# Patient Record
Sex: MALE | Race: BLACK OR AFRICAN AMERICAN | Employment: FULL TIME | URBAN - METROPOLITAN AREA
[De-identification: names, ages, dates, MRNs, and addresses within clinical notes are randomized per-mention and may not be internally consistent; named-entity substitution may affect disease eponyms.]

---

## 2019-02-14 PROBLEM — R31.29 MICROSCOPIC HEMATURIA: Status: ACTIVE | Noted: 2019-02-14

## 2019-02-14 PROBLEM — R31.9 HEMATURIA: Status: ACTIVE | Noted: 2019-02-14

## 2019-04-08 ENCOUNTER — HOSPITAL ENCOUNTER (OUTPATIENT)
Dept: PREADMISSION TESTING | Age: 39
Discharge: HOME | End: 2019-04-08
Payer: COMMERCIAL

## 2019-04-08 VITALS — WEIGHT: 244 LBS | BODY MASS INDEX: 34.16 KG/M2 | HEIGHT: 71 IN

## 2019-04-08 LAB
ALBUMIN SERPL-MCNC: 4.1 G/DL (ref 3.4–5)
ALBUMIN/GLOB SERPL: 1.1 {RATIO} (ref 0.8–1.7)
ALP SERPL-CCNC: 77 U/L (ref 45–117)
ALT SERPL-CCNC: 34 U/L (ref 16–61)
ANION GAP SERPL CALC-SCNC: 9 MMOL/L (ref 3–18)
AST SERPL-CCNC: 23 U/L (ref 15–37)
BACTERIA SPEC CULT: NORMAL
BILIRUB SERPL-MCNC: 0.7 MG/DL (ref 0.2–1)
BUN SERPL-MCNC: 12 MG/DL (ref 7–18)
BUN/CREAT SERPL: 11 (ref 12–20)
CALCIUM SERPL-MCNC: 9.1 MG/DL (ref 8.5–10.1)
CHLORIDE SERPL-SCNC: 104 MMOL/L (ref 100–108)
CO2 SERPL-SCNC: 27 MMOL/L (ref 21–32)
CREAT SERPL-MCNC: 1.06 MG/DL (ref 0.6–1.3)
ERYTHROCYTE [DISTWIDTH] IN BLOOD BY AUTOMATED COUNT: 12.1 % (ref 11.6–14.5)
GLOBULIN SER CALC-MCNC: 3.8 G/DL (ref 2–4)
GLUCOSE SERPL-MCNC: 78 MG/DL (ref 74–99)
HCT VFR BLD AUTO: 40.5 % (ref 36–48)
HGB BLD-MCNC: 13.8 G/DL (ref 13–16)
MCH RBC QN AUTO: 31.2 PG (ref 24–34)
MCHC RBC AUTO-ENTMCNC: 34.1 G/DL (ref 31–37)
MCV RBC AUTO: 91.6 FL (ref 74–97)
PLATELET # BLD AUTO: 247 K/UL (ref 135–420)
PMV BLD AUTO: 10.3 FL (ref 9.2–11.8)
POTASSIUM SERPL-SCNC: 3.8 MMOL/L (ref 3.5–5.5)
PROT SERPL-MCNC: 7.9 G/DL (ref 6.4–8.2)
RBC # BLD AUTO: 4.42 M/UL (ref 4.7–5.5)
SERVICE CMNT-IMP: NORMAL
SODIUM SERPL-SCNC: 140 MMOL/L (ref 136–145)
WBC # BLD AUTO: 10.8 K/UL (ref 4.6–13.2)

## 2019-04-08 PROCEDURE — 80053 COMPREHEN METABOLIC PANEL: CPT

## 2019-04-08 PROCEDURE — 85027 COMPLETE CBC AUTOMATED: CPT

## 2019-04-08 PROCEDURE — 87641 MR-STAPH DNA AMP PROBE: CPT

## 2019-04-08 PROCEDURE — 93005 ELECTROCARDIOGRAM TRACING: CPT

## 2019-04-08 RX ORDER — CEFAZOLIN SODIUM 2 G/50ML
2 SOLUTION INTRAVENOUS ONCE
Status: CANCELLED | OUTPATIENT
Start: 2019-04-30 | End: 2019-04-30

## 2019-04-08 RX ORDER — SODIUM CHLORIDE, SODIUM LACTATE, POTASSIUM CHLORIDE, CALCIUM CHLORIDE 600; 310; 30; 20 MG/100ML; MG/100ML; MG/100ML; MG/100ML
125 INJECTION, SOLUTION INTRAVENOUS CONTINUOUS
Status: CANCELLED | OUTPATIENT
Start: 2019-04-30

## 2019-04-08 NOTE — PERIOP NOTES
Patient arrived for his PAT visit. Pre-admit testing completed. Patient is very healthy. SARAI prep reviewed and given. Patient denied sleep apnea. Patient does not meet criteria for spec pop. Patient instructed not to have anything to eat after midnight- night before sx. Patient also instructed not to bring valuables and not to wear jewelry DOS. PAT appt completed.

## 2019-04-10 LAB
ATRIAL RATE: 67 BPM
CALCULATED P AXIS, ECG09: 47 DEGREES
CALCULATED R AXIS, ECG10: 34 DEGREES
CALCULATED T AXIS, ECG11: 3 DEGREES
DIAGNOSIS, 93000: NORMAL
P-R INTERVAL, ECG05: 152 MS
Q-T INTERVAL, ECG07: 388 MS
QRS DURATION, ECG06: 92 MS
QTC CALCULATION (BEZET), ECG08: 409 MS
VENTRICULAR RATE, ECG03: 67 BPM

## 2019-04-18 NOTE — H&P
Patient Name:  Aba Castle YOB: 1980 Chief Complaint:  Lower back pain. History of Chief Complaint:  Mr. Willian Vick is a 27-year-old gentleman who is being seen in consultation at the request of Dr. Carey Delacruz for lower back pain. He has had numbness down the leg since October. There is no specific accident or injuries. Standing for any length of time or walking for any length of time causes pain. He has shooting pain down his left hip and leg. He gets burning in the left hip and leg. He also has right-sided numbness in his arm. There is no back pain. His balance seems to be worse, especially in the morning when trying to get up out of bed. He feels like he cannot get his feet under him. He is doing his home exercise program that he got through Dr. Carey Delacruz and is really not making much progress. He has difficulty bending, turning, and twisting. He has been placed on prednisone and muscle relaxers. He is walking and cycling, but nothing where he is moving his feet too fast.He had an MRI of cervical spine because he was found to be myelopathic on physical exam. 
 
Past Medical/Surgical History:  Patient reported no relevant past medical/surgical history. Allergies:  No known allergies. Ingredient Reaction Medication Name Comment NO KNOWN ALLERGIES Current Medications:   
Medication Directions Aleve 220 mg tablet   
multivitamin tablet Omega-3 B Complex   
oregano oil   
turmeric root extract Social History: SMOKING Status Tobacco Type Units Per Day Yrs Used Never smoker ALCOHOL There is a history of alcohol use. Type: Beer and wine. 2 beers consumed rarely. Family History:   
Disease Detail Family Member Age Cause of Death Comments Polymyositis Father  N Lupus erythematosus Sister  N Review of Systems:    Pertinent positives include blood in urine, loss of balance, muscle weakness and numbness/tingling.   Pertinent negatives include blurred vision, chest pain, chills, cold, discharge of the eyes, dizziness, double vision, fever, headache, hearing loss, heart murmur, itching of the eyes, palpitations, redness of the eyes, rheumatic fever, ringing in ears, sore throat/hoarseness, weight change, abdominal pain, anxiety, bipolar disorder, bladder/kidney infection, bloody stool, burning sensation, changes in mood, chronic cough, depression, diarrhea, difficulty breathing, difficulty swallowing, fainting, frequent urinating, fracture/dislocation, gas/bloating, gout, hemorrhoids, incontinence, joint pain, joint stiffness, memory loss, nausea/vomiting, pain on breathing, painful urination, psoriasis, rash/itching, Raynaud's phenomenon, rheumatoid disease, seizure disorder, shortness of breath, sprain/strain, swelling of feet, tendonitis, varicose veins and wheezing. Vitals: 
Date BP Pulse Temp (F) Resp. (per min.) Height (Total in.) Weight (lbs.) BMI  
03/04/2019     70.00 245.00 35.15 Physical Examination:   
General:  The patient appears healthy. Cardiovascular:  Arterial pulses are normal. 
Skin:  The skin is normal appearing with no contusions or wounds noted. Heart- RRR Lungs-CTA winnie Abdomen- +BS,soft,nontender Musculoskeletal:  The thoracolumbar spine has normal kyphosis, a normal appearance, and no scoliosis. There is full range of motion of the cervical, thoracic, and lumbar spine and of the hips, knees, and ankles. Straight leg raising and crossed straight leg raising tests are negative. Neurological:  He has hyperreflexia of the upper and lower extremities. There is no weakness in the thoracic, lumbar, or sacral spine or in the lower extremities or hips. Ankle and knee jerks are normal with no clonus. Radiograph Examination:  AP, lateral, bilateral oblique, flexion and extension views of the lumbar spine were obtained and interpreted in the office 3/4/19 and reveal mild degenerative disc disease at L4-5. An AP view of the pelvis was obtained and interpreted in the office  and reveals no periosteal reaction, no medullary lesions, no osteopenia, well-aligned joint spaces, no chondrolysis, and no fractures. Review of his MRI scan of the cervical spine [facility] 3/13/19reveals severe spinal stenosis, degenerative disc disease, and myelomalacia at C3 through C6. Plan: Mr. Rubina Priest and ALEXANDREA had a long discussion about the treatments for his cervical spinal stenosis, myelomalacia, disc herniation, balance issues, and myelopathy including surgical intervention, the risks, and benefits as well as the different surgical approaches and decision making. We also discussed nonsurgical care for this condition including medications, injections, physical therapy, rehabilitation, activity modification, and brace utilization. At this point, he has improved with physical therapy for the lower back. He can continue with that. Concerning his cervical myelomalacia, he would like to proceed with operative intervention. We will plan for C3 to C6 ACDF. The risks versus the benefits as well as the alternatives were fully explained to the patient. Risks include, but are not limited to, paralysis, death, heart attack, stroke, pulmonary embolism, deep vein thrombosis, infection, failure to relieve pain, increase in back or arm pain, reherniation, scarring, spinal fluid leak, bowel or bladder dysfunction, bleeding, disease transmission, instrumentation failure, pseudarthrosis, difficulty swallowing, and need for revision surgery. The patient states full understanding of the risks and benefits and wishes to proceed.

## 2019-04-24 PROBLEM — G95.89 CERVICAL CORD MYELOMALACIA (HCC): Status: ACTIVE | Noted: 2019-04-24

## 2019-04-24 PROBLEM — M50.30 DDD (DEGENERATIVE DISC DISEASE), CERVICAL: Status: ACTIVE | Noted: 2019-04-24

## 2019-04-24 PROBLEM — M48.02 CERVICAL SPINAL STENOSIS: Status: ACTIVE | Noted: 2019-04-24

## 2019-04-24 PROBLEM — M50.20 HNP (HERNIATED NUCLEUS PULPOSUS), CERVICAL: Status: ACTIVE | Noted: 2019-04-24

## 2019-04-30 ENCOUNTER — ANESTHESIA EVENT (OUTPATIENT)
Dept: SURGERY | Age: 39
DRG: 473 | End: 2019-04-30
Payer: COMMERCIAL

## 2019-04-30 ENCOUNTER — HOSPITAL ENCOUNTER (INPATIENT)
Age: 39
LOS: 1 days | Discharge: HOME HEALTH | DRG: 473 | End: 2019-05-01
Attending: ORTHOPAEDIC SURGERY | Admitting: ORTHOPAEDIC SURGERY
Payer: COMMERCIAL

## 2019-04-30 ENCOUNTER — APPOINTMENT (OUTPATIENT)
Dept: GENERAL RADIOLOGY | Age: 39
DRG: 473 | End: 2019-04-30
Attending: ORTHOPAEDIC SURGERY
Payer: COMMERCIAL

## 2019-04-30 ENCOUNTER — ANESTHESIA (OUTPATIENT)
Dept: SURGERY | Age: 39
DRG: 473 | End: 2019-04-30
Payer: COMMERCIAL

## 2019-04-30 DIAGNOSIS — M48.02 CERVICAL SPINAL STENOSIS: Primary | ICD-10-CM

## 2019-04-30 LAB
ABO + RH BLD: NORMAL
BLOOD GROUP ANTIBODIES SERPL: NORMAL
SPECIMEN EXP DATE BLD: NORMAL

## 2019-04-30 PROCEDURE — 74011000250 HC RX REV CODE- 250: Performed by: ORTHOPAEDIC SURGERY

## 2019-04-30 PROCEDURE — 74011000272 HC RX REV CODE- 272: Performed by: ORTHOPAEDIC SURGERY

## 2019-04-30 PROCEDURE — 74011000250 HC RX REV CODE- 250

## 2019-04-30 PROCEDURE — 97162 PT EVAL MOD COMPLEX 30 MIN: CPT

## 2019-04-30 PROCEDURE — 77030013567 HC DRN WND RESERV BARD -A: Performed by: ORTHOPAEDIC SURGERY

## 2019-04-30 PROCEDURE — 77030011267 HC ELECTRD BLD COVD -A: Performed by: ORTHOPAEDIC SURGERY

## 2019-04-30 PROCEDURE — C1713 ANCHOR/SCREW BN/BN,TIS/BN: HCPCS | Performed by: ORTHOPAEDIC SURGERY

## 2019-04-30 PROCEDURE — 76060000034 HC ANESTHESIA 1.5 TO 2 HR: Performed by: ORTHOPAEDIC SURGERY

## 2019-04-30 PROCEDURE — 0RG20K0 FUSION OF 2 OR MORE CERVICAL VERTEBRAL JOINTS WITH NONAUTOLOGOUS TISSUE SUBSTITUTE, ANTERIOR APPROACH, ANTERIOR COLUMN, OPEN APPROACH: ICD-10-PCS | Performed by: ORTHOPAEDIC SURGERY

## 2019-04-30 PROCEDURE — 77030004402 HC BUR NEUR STRY -C: Performed by: ORTHOPAEDIC SURGERY

## 2019-04-30 PROCEDURE — 74011250636 HC RX REV CODE- 250/636

## 2019-04-30 PROCEDURE — 77030039266 HC ADH SKN EXOFIN S2SG -A: Performed by: ORTHOPAEDIC SURGERY

## 2019-04-30 PROCEDURE — 77010033678 HC OXYGEN DAILY

## 2019-04-30 PROCEDURE — 74011250637 HC RX REV CODE- 250/637: Performed by: PHYSICIAN ASSISTANT

## 2019-04-30 PROCEDURE — 97116 GAIT TRAINING THERAPY: CPT

## 2019-04-30 PROCEDURE — 77030036881: Performed by: ORTHOPAEDIC SURGERY

## 2019-04-30 PROCEDURE — 77030009863 HC PIN CERV DISTR SYNT -B: Performed by: ORTHOPAEDIC SURGERY

## 2019-04-30 PROCEDURE — 77030031588 HC SPCR SPN ZEROP VA SYNT -I: Performed by: ORTHOPAEDIC SURGERY

## 2019-04-30 PROCEDURE — 0RT30ZZ RESECTION OF CERVICAL VERTEBRAL DISC, OPEN APPROACH: ICD-10-PCS | Performed by: ORTHOPAEDIC SURGERY

## 2019-04-30 PROCEDURE — 77030008462 HC STPLR SKN PROX J&J -A: Performed by: ORTHOPAEDIC SURGERY

## 2019-04-30 PROCEDURE — 74011250636 HC RX REV CODE- 250/636: Performed by: ORTHOPAEDIC SURGERY

## 2019-04-30 PROCEDURE — 77030008683 HC TU ET CUF COVD -A: Performed by: ANESTHESIOLOGY

## 2019-04-30 PROCEDURE — 65270000029 HC RM PRIVATE

## 2019-04-30 PROCEDURE — 76010000153 HC OR TIME 1.5 TO 2 HR: Performed by: ORTHOPAEDIC SURGERY

## 2019-04-30 PROCEDURE — 77030040361 HC SLV COMPR DVT MDII -B: Performed by: ORTHOPAEDIC SURGERY

## 2019-04-30 PROCEDURE — 77030003029 HC SUT VCRL J&J -B: Performed by: ORTHOPAEDIC SURGERY

## 2019-04-30 PROCEDURE — 77030018836 HC SOL IRR NACL ICUM -A: Performed by: ORTHOPAEDIC SURGERY

## 2019-04-30 PROCEDURE — 0RG20A0 FUSION OF 2 OR MORE CERVICAL VERTEBRAL JOINTS WITH INTERBODY FUSION DEVICE, ANTERIOR APPROACH, ANTERIOR COLUMN, OPEN APPROACH: ICD-10-PCS | Performed by: ORTHOPAEDIC SURGERY

## 2019-04-30 PROCEDURE — 74011250636 HC RX REV CODE- 250/636: Performed by: PHYSICIAN ASSISTANT

## 2019-04-30 PROCEDURE — 36415 COLL VENOUS BLD VENIPUNCTURE: CPT

## 2019-04-30 PROCEDURE — 77030002986 HC SUT PROL J&J -A: Performed by: ORTHOPAEDIC SURGERY

## 2019-04-30 PROCEDURE — 86900 BLOOD TYPING SEROLOGIC ABO: CPT

## 2019-04-30 PROCEDURE — 74011250637 HC RX REV CODE- 250/637: Performed by: ANESTHESIOLOGY

## 2019-04-30 PROCEDURE — 76210000016 HC OR PH I REC 1 TO 1.5 HR: Performed by: ORTHOPAEDIC SURGERY

## 2019-04-30 PROCEDURE — 77030034475 HC MISC IMPL SPN: Performed by: ORTHOPAEDIC SURGERY

## 2019-04-30 PROCEDURE — 77030012406 HC DRN WND PENRS BARD -A: Performed by: ORTHOPAEDIC SURGERY

## 2019-04-30 PROCEDURE — 77030020782 HC GWN BAIR PAWS FLX 3M -B: Performed by: ORTHOPAEDIC SURGERY

## 2019-04-30 DEVICE — SCREW SPNL 4.2X14MM SELF DRL INVUE: Type: IMPLANTABLE DEVICE | Site: SPINE CERVICAL | Status: FUNCTIONAL

## 2019-04-30 DEVICE — SCREW SPNL L16MM DIA3.7MM NONSTERILE ANT CERV PUR TI SELF: Type: IMPLANTABLE DEVICE | Site: SPINE CERVICAL | Status: FUNCTIONAL

## 2019-04-30 DEVICE — PLATE SPNL L43MM 2 LEV ACP INVUE: Type: IMPLANTABLE DEVICE | Site: SPINE CERVICAL | Status: FUNCTIONAL

## 2019-04-30 DEVICE — GRAFT SPNL SPCR W145XH9XL12MM 7DEG CERV LORDOSIS PRESERVON: Type: IMPLANTABLE DEVICE | Site: SPINE CERVICAL | Status: FUNCTIONAL

## 2019-04-30 DEVICE — SPACER SPNL ZERO-P VA IMPL 9MM LORDOTIC STERILE: Type: IMPLANTABLE DEVICE | Site: SPINE CERVICAL | Status: FUNCTIONAL

## 2019-04-30 RX ORDER — ZOLPIDEM TARTRATE 5 MG/1
5 TABLET ORAL
Status: DISCONTINUED | OUTPATIENT
Start: 2019-04-30 | End: 2019-05-01 | Stop reason: HOSPADM

## 2019-04-30 RX ORDER — DEXTROSE 50 % IN WATER (D50W) INTRAVENOUS SYRINGE
25-50 AS NEEDED
Status: DISCONTINUED | OUTPATIENT
Start: 2019-04-30 | End: 2019-04-30 | Stop reason: HOSPADM

## 2019-04-30 RX ORDER — DIPHENHYDRAMINE HCL 25 MG
25 CAPSULE ORAL
Status: DISCONTINUED | OUTPATIENT
Start: 2019-04-30 | End: 2019-05-01 | Stop reason: HOSPADM

## 2019-04-30 RX ORDER — INSULIN LISPRO 100 [IU]/ML
INJECTION, SOLUTION INTRAVENOUS; SUBCUTANEOUS ONCE
Status: DISCONTINUED | OUTPATIENT
Start: 2019-04-30 | End: 2019-04-30 | Stop reason: HOSPADM

## 2019-04-30 RX ORDER — SODIUM CHLORIDE, SODIUM LACTATE, POTASSIUM CHLORIDE, CALCIUM CHLORIDE 600; 310; 30; 20 MG/100ML; MG/100ML; MG/100ML; MG/100ML
50 INJECTION, SOLUTION INTRAVENOUS CONTINUOUS
Status: DISCONTINUED | OUTPATIENT
Start: 2019-04-30 | End: 2019-04-30 | Stop reason: HOSPADM

## 2019-04-30 RX ORDER — CEFAZOLIN SODIUM 2 G/50ML
2 SOLUTION INTRAVENOUS ONCE
Status: COMPLETED | OUTPATIENT
Start: 2019-04-30 | End: 2019-04-30

## 2019-04-30 RX ORDER — FENTANYL CITRATE 50 UG/ML
25 INJECTION, SOLUTION INTRAMUSCULAR; INTRAVENOUS
Status: DISCONTINUED | OUTPATIENT
Start: 2019-04-30 | End: 2019-04-30 | Stop reason: HOSPADM

## 2019-04-30 RX ORDER — PROPOFOL 10 MG/ML
INJECTION, EMULSION INTRAVENOUS AS NEEDED
Status: DISCONTINUED | OUTPATIENT
Start: 2019-04-30 | End: 2019-04-30 | Stop reason: HOSPADM

## 2019-04-30 RX ORDER — OXYCODONE AND ACETAMINOPHEN 5; 325 MG/1; MG/1
1 TABLET ORAL AS NEEDED
Status: DISCONTINUED | OUTPATIENT
Start: 2019-04-30 | End: 2019-04-30 | Stop reason: HOSPADM

## 2019-04-30 RX ORDER — ONDANSETRON 2 MG/ML
4 INJECTION INTRAMUSCULAR; INTRAVENOUS ONCE
Status: DISCONTINUED | OUTPATIENT
Start: 2019-04-30 | End: 2019-04-30 | Stop reason: HOSPADM

## 2019-04-30 RX ORDER — CEFAZOLIN SODIUM 2 G/50ML
2 SOLUTION INTRAVENOUS EVERY 8 HOURS
Status: COMPLETED | OUTPATIENT
Start: 2019-04-30 | End: 2019-05-01

## 2019-04-30 RX ORDER — NALOXONE HYDROCHLORIDE 0.4 MG/ML
0.1 INJECTION, SOLUTION INTRAMUSCULAR; INTRAVENOUS; SUBCUTANEOUS AS NEEDED
Status: DISCONTINUED | OUTPATIENT
Start: 2019-04-30 | End: 2019-05-01 | Stop reason: HOSPADM

## 2019-04-30 RX ORDER — DOCUSATE SODIUM 100 MG/1
100 CAPSULE, LIQUID FILLED ORAL 2 TIMES DAILY
Status: DISCONTINUED | OUTPATIENT
Start: 2019-04-30 | End: 2019-05-01 | Stop reason: HOSPADM

## 2019-04-30 RX ORDER — OXYCODONE AND ACETAMINOPHEN 10; 325 MG/1; MG/1
1 TABLET ORAL
Status: DISCONTINUED | OUTPATIENT
Start: 2019-04-30 | End: 2019-05-01 | Stop reason: HOSPADM

## 2019-04-30 RX ORDER — ROCURONIUM BROMIDE 10 MG/ML
INJECTION, SOLUTION INTRAVENOUS AS NEEDED
Status: DISCONTINUED | OUTPATIENT
Start: 2019-04-30 | End: 2019-04-30 | Stop reason: HOSPADM

## 2019-04-30 RX ORDER — MIDAZOLAM HYDROCHLORIDE 1 MG/ML
INJECTION, SOLUTION INTRAMUSCULAR; INTRAVENOUS AS NEEDED
Status: DISCONTINUED | OUTPATIENT
Start: 2019-04-30 | End: 2019-04-30 | Stop reason: HOSPADM

## 2019-04-30 RX ORDER — LIDOCAINE HYDROCHLORIDE 20 MG/ML
INJECTION, SOLUTION EPIDURAL; INFILTRATION; INTRACAUDAL; PERINEURAL AS NEEDED
Status: DISCONTINUED | OUTPATIENT
Start: 2019-04-30 | End: 2019-04-30 | Stop reason: HOSPADM

## 2019-04-30 RX ORDER — DEXAMETHASONE SODIUM PHOSPHATE 4 MG/ML
INJECTION, SOLUTION INTRA-ARTICULAR; INTRALESIONAL; INTRAMUSCULAR; INTRAVENOUS; SOFT TISSUE AS NEEDED
Status: DISCONTINUED | OUTPATIENT
Start: 2019-04-30 | End: 2019-04-30 | Stop reason: HOSPADM

## 2019-04-30 RX ORDER — NEOSTIGMINE METHYLSULFATE 5 MG/5 ML
SYRINGE (ML) INTRAVENOUS AS NEEDED
Status: DISCONTINUED | OUTPATIENT
Start: 2019-04-30 | End: 2019-04-30 | Stop reason: HOSPADM

## 2019-04-30 RX ORDER — GLYCOPYRROLATE 0.2 MG/ML
INJECTION INTRAMUSCULAR; INTRAVENOUS AS NEEDED
Status: DISCONTINUED | OUTPATIENT
Start: 2019-04-30 | End: 2019-04-30 | Stop reason: HOSPADM

## 2019-04-30 RX ORDER — HYDROMORPHONE HYDROCHLORIDE 2 MG/ML
0.5 INJECTION, SOLUTION INTRAMUSCULAR; INTRAVENOUS; SUBCUTANEOUS
Status: DISCONTINUED | OUTPATIENT
Start: 2019-04-30 | End: 2019-04-30 | Stop reason: HOSPADM

## 2019-04-30 RX ORDER — OXYCODONE AND ACETAMINOPHEN 5; 325 MG/1; MG/1
1 TABLET ORAL
Status: DISCONTINUED | OUTPATIENT
Start: 2019-04-30 | End: 2019-05-01 | Stop reason: HOSPADM

## 2019-04-30 RX ORDER — DIAZEPAM 5 MG/1
5 TABLET ORAL ONCE
Status: COMPLETED | OUTPATIENT
Start: 2019-04-30 | End: 2019-04-30

## 2019-04-30 RX ORDER — ACETAMINOPHEN 325 MG/1
650 TABLET ORAL
Status: DISCONTINUED | OUTPATIENT
Start: 2019-04-30 | End: 2019-05-01 | Stop reason: HOSPADM

## 2019-04-30 RX ORDER — ONDANSETRON 2 MG/ML
INJECTION INTRAMUSCULAR; INTRAVENOUS AS NEEDED
Status: DISCONTINUED | OUTPATIENT
Start: 2019-04-30 | End: 2019-04-30 | Stop reason: HOSPADM

## 2019-04-30 RX ORDER — EPHEDRINE SULFATE/0.9% NACL/PF 25 MG/5 ML
SYRINGE (ML) INTRAVENOUS AS NEEDED
Status: DISCONTINUED | OUTPATIENT
Start: 2019-04-30 | End: 2019-04-30 | Stop reason: HOSPADM

## 2019-04-30 RX ORDER — MAGNESIUM SULFATE 100 %
4 CRYSTALS MISCELLANEOUS AS NEEDED
Status: DISCONTINUED | OUTPATIENT
Start: 2019-04-30 | End: 2019-04-30 | Stop reason: HOSPADM

## 2019-04-30 RX ORDER — HYDROMORPHONE HYDROCHLORIDE 1 MG/ML
INJECTION, SOLUTION INTRAMUSCULAR; INTRAVENOUS; SUBCUTANEOUS AS NEEDED
Status: DISCONTINUED | OUTPATIENT
Start: 2019-04-30 | End: 2019-04-30 | Stop reason: HOSPADM

## 2019-04-30 RX ORDER — FENTANYL CITRATE 50 UG/ML
INJECTION, SOLUTION INTRAMUSCULAR; INTRAVENOUS AS NEEDED
Status: DISCONTINUED | OUTPATIENT
Start: 2019-04-30 | End: 2019-04-30 | Stop reason: HOSPADM

## 2019-04-30 RX ORDER — DIAZEPAM 5 MG/1
5 TABLET ORAL
Status: DISCONTINUED | OUTPATIENT
Start: 2019-04-30 | End: 2019-05-01 | Stop reason: HOSPADM

## 2019-04-30 RX ORDER — ONDANSETRON 4 MG/1
4 TABLET, ORALLY DISINTEGRATING ORAL
Status: DISCONTINUED | OUTPATIENT
Start: 2019-04-30 | End: 2019-05-01 | Stop reason: HOSPADM

## 2019-04-30 RX ORDER — SODIUM CHLORIDE 0.9 % (FLUSH) 0.9 %
5-40 SYRINGE (ML) INJECTION EVERY 8 HOURS
Status: DISCONTINUED | OUTPATIENT
Start: 2019-04-30 | End: 2019-05-01 | Stop reason: HOSPADM

## 2019-04-30 RX ORDER — SODIUM CHLORIDE, SODIUM LACTATE, POTASSIUM CHLORIDE, CALCIUM CHLORIDE 600; 310; 30; 20 MG/100ML; MG/100ML; MG/100ML; MG/100ML
125 INJECTION, SOLUTION INTRAVENOUS CONTINUOUS
Status: DISCONTINUED | OUTPATIENT
Start: 2019-04-30 | End: 2019-05-01 | Stop reason: HOSPADM

## 2019-04-30 RX ORDER — NALOXONE HYDROCHLORIDE 0.4 MG/ML
0.1 INJECTION, SOLUTION INTRAMUSCULAR; INTRAVENOUS; SUBCUTANEOUS
Status: DISCONTINUED | OUTPATIENT
Start: 2019-04-30 | End: 2019-04-30 | Stop reason: HOSPADM

## 2019-04-30 RX ORDER — SODIUM CHLORIDE 0.9 % (FLUSH) 0.9 %
5-40 SYRINGE (ML) INJECTION AS NEEDED
Status: DISCONTINUED | OUTPATIENT
Start: 2019-04-30 | End: 2019-05-01 | Stop reason: HOSPADM

## 2019-04-30 RX ADMIN — ROCURONIUM BROMIDE 10 MG: 10 INJECTION, SOLUTION INTRAVENOUS at 11:10

## 2019-04-30 RX ADMIN — DOCUSATE SODIUM 100 MG: 100 CAPSULE, LIQUID FILLED ORAL at 21:04

## 2019-04-30 RX ADMIN — PROPOFOL 200 MG: 10 INJECTION, EMULSION INTRAVENOUS at 10:14

## 2019-04-30 RX ADMIN — MIDAZOLAM HYDROCHLORIDE 2 MG: 1 INJECTION, SOLUTION INTRAMUSCULAR; INTRAVENOUS at 10:03

## 2019-04-30 RX ADMIN — LIDOCAINE HYDROCHLORIDE 60 MG: 20 INJECTION, SOLUTION EPIDURAL; INFILTRATION; INTRACAUDAL; PERINEURAL at 10:14

## 2019-04-30 RX ADMIN — Medication 3 MG: at 11:31

## 2019-04-30 RX ADMIN — ONDANSETRON 4 MG: 2 INJECTION INTRAMUSCULAR; INTRAVENOUS at 11:33

## 2019-04-30 RX ADMIN — SODIUM CHLORIDE, SODIUM LACTATE, POTASSIUM CHLORIDE, AND CALCIUM CHLORIDE 125 ML/HR: 600; 310; 30; 20 INJECTION, SOLUTION INTRAVENOUS at 13:32

## 2019-04-30 RX ADMIN — DIAZEPAM 5 MG: 5 TABLET ORAL at 09:29

## 2019-04-30 RX ADMIN — Medication 10 MG: at 11:18

## 2019-04-30 RX ADMIN — ROCURONIUM BROMIDE 20 MG: 10 INJECTION, SOLUTION INTRAVENOUS at 10:43

## 2019-04-30 RX ADMIN — CEFAZOLIN SODIUM 2 G: 2 SOLUTION INTRAVENOUS at 10:24

## 2019-04-30 RX ADMIN — SODIUM CHLORIDE, SODIUM LACTATE, POTASSIUM CHLORIDE, AND CALCIUM CHLORIDE 125 ML/HR: 600; 310; 30; 20 INJECTION, SOLUTION INTRAVENOUS at 07:58

## 2019-04-30 RX ADMIN — SODIUM CHLORIDE, SODIUM LACTATE, POTASSIUM CHLORIDE, AND CALCIUM CHLORIDE: 600; 310; 30; 20 INJECTION, SOLUTION INTRAVENOUS at 11:32

## 2019-04-30 RX ADMIN — Medication: at 13:31

## 2019-04-30 RX ADMIN — GLYCOPYRROLATE 0.6 MG: 0.2 INJECTION INTRAMUSCULAR; INTRAVENOUS at 11:31

## 2019-04-30 RX ADMIN — DEXAMETHASONE SODIUM PHOSPHATE 4 MG: 4 INJECTION, SOLUTION INTRA-ARTICULAR; INTRALESIONAL; INTRAMUSCULAR; INTRAVENOUS; SOFT TISSUE at 11:31

## 2019-04-30 RX ADMIN — HYDROMORPHONE HYDROCHLORIDE 1 MG: 1 INJECTION, SOLUTION INTRAMUSCULAR; INTRAVENOUS; SUBCUTANEOUS at 11:30

## 2019-04-30 RX ADMIN — FENTANYL CITRATE 100 MCG: 50 INJECTION, SOLUTION INTRAMUSCULAR; INTRAVENOUS at 10:14

## 2019-04-30 RX ADMIN — Medication 10 ML: at 21:04

## 2019-04-30 RX ADMIN — CEFAZOLIN SODIUM 2 G: 2 SOLUTION INTRAVENOUS at 17:20

## 2019-04-30 RX ADMIN — ROCURONIUM BROMIDE 50 MG: 10 INJECTION, SOLUTION INTRAVENOUS at 10:14

## 2019-04-30 RX ADMIN — Medication: at 13:01

## 2019-04-30 NOTE — PERIOP NOTES
Reviewed PTA medication list with patient/caregiver and patient/caregiver denies any additional medications. Patient admits to having a responsible adult care for them for at least 24 hours after surgery. 
  
Permission granted by patient for a dual skin assessment. Dual skin assessment completed by Yisel Armstrong RN and Jake Canas RN.

## 2019-04-30 NOTE — INTERVAL H&P NOTE
H&P Update: 
Jordyn Sun III was seen and examined. History and physical has been reviewed. The patient has been examined.  There have been no significant clinical changes since the completion of the originally dated History and Physical.

## 2019-04-30 NOTE — ANESTHESIA PREPROCEDURE EVALUATION
Relevant Problems No relevant active problems Anesthetic History No history of anesthetic complications Review of Systems / Medical History Patient summary reviewed, nursing notes reviewed and pertinent labs reviewed Pulmonary Within defined limits Neuro/Psych Within defined limits Cardiovascular Within defined limits GI/Hepatic/Renal 
Within defined limits Endo/Other Arthritis Other Findings Physical Exam 
 
Airway Mallampati: II 
TM Distance: 4 - 6 cm Neck ROM: normal range of motion Mouth opening: Normal 
 
 Cardiovascular Regular rate and rhythm,  S1 and S2 normal,  no murmur, click, rub, or gallop Dental 
No notable dental hx Pulmonary Breath sounds clear to auscultation Abdominal 
GI exam deferred Other Findings Anesthetic Plan ASA: 1 Anesthesia type: general 
 
 
 
 
Induction: Intravenous Anesthetic plan and risks discussed with: Patient

## 2019-04-30 NOTE — ANESTHESIA POSTPROCEDURE EVALUATION
Post-Anesthesia Evaluation and Assessment Cardiovascular Function/Vital Signs Visit Vitals /80 Pulse 60 Temp 36.3 °C (97.3 °F) Resp 14 Ht 5' 10.5\" (1.791 m) Wt 109.5 kg (241 lb 8 oz) SpO2 100% BMI 34.16 kg/m² Patient is status post Procedure(s): CERVICAL 3-6 ANTERIOR CERVICAL DISC FUSION W/C-ARM. Nausea/Vomiting: Controlled. Postoperative hydration reviewed and adequate. Pain: 
Pain Scale 1: Visual (04/30/19 1249) Pain Intensity 1: 0 (04/30/19 1249) Managed. Neurological Status:  
Neuro (WDL): Exceptions to WDL (04/30/19 1209) At baseline. Mental Status and Level of Consciousness: Baseline and stable. Pulmonary Status:  
O2 Device: Nasal cannula (04/30/19 1249) Adequate oxygenation and airway patent. Complications related to anesthesia: None Post-anesthesia assessment completed. No concerns. Patient has met all discharge requirements.  
 
Signed By: Maxime Acevedo MD

## 2019-04-30 NOTE — PERIOP NOTES
TRANSFER - IN REPORT: 
 
Verbal report received from Dr. Watson hector (name) on Wells Gehrig III  being received from OR (unit) for routine progression of care Report consisted of patients Situation, Background, Assessment and  
Recommendations(SBAR). Information from the following report(s) OR Summary, Procedure Summary, Intake/Output and MAR was reviewed with the receiving nurse. Opportunity for questions and clarification was provided. Assessment completed upon patients arrival to unit and care assumed.

## 2019-04-30 NOTE — PROGRESS NOTES
[facility] at this time. Assessment completed in flowsheet. Pt is alert and oriented x 4. Denies SOB and chest pain. Pt lungs clear bilaterally. Capillary refill less than 3 seconds. Pt denies numbness and tingling to all extremities. Stated pain  0/10. Pt has 20G IV to the  RT FA. Pt has 4 dressing. SCDs and TEDs applied bilaterally. Pt encouraged to continue use of IS, Pt verbalized understanding. Call light and possessions within reach. Bed is in the lowest position. Will continue to monitor. Yellow Fall Risk armband is currently on RT arm.   
 
1950 Bedside and Verbal shift change report given to SusyRN by Cornell Jones RN. Report included the following information SBAR, Kardex, OR Summary, Intake/Output and MAR.

## 2019-04-30 NOTE — PROGRESS NOTES
Problem: Mobility Impaired (Adult and Pediatric) Goal: *Acute Goals and Plan of Care (Insert Text) Description Physical Therapy Goals Initiated 4/30/2019 and to be accomplished within 3-5 day(s) 1. Patient will move from supine <> sit with S in prep for out of bed activity and change of position. 2.  Patient will perform sit<> stand with S with LRAD in prep for transfers/ambulation. 3.  Patient will transfer from bed <> chair with S with LRAD for time up in chair for completion of ADL activity. 4.  Patient will ambulate 150 feet with LRAD/S for improved functional mobility/safe discharge. 5.  Patient will ascend/descend 3-5 stairs with or without handrail with minimal assistance/contact guard assist for home re-entry as needed. Outcome: Progressing Towards Goal 
 PHYSICAL THERAPY EVALUATION Patient: Juan Walters (41 y.o. male) Date: 4/30/2019 Primary Diagnosis: Cervical spinal stenosis [M48.02] Procedure(s) (LRB): 
CERVICAL 3-6 ANTERIOR CERVICAL DISC FUSION W/C-ARM (N/A) Day of Surgery Precautions:   Fall, Spinal 
 
ASSESSMENT : 
Based on the objective data described below, the patient presents with decrease independence w/ bed mobility, transfers, gait, and step negotiation. Pt seen in supine prior to session w/ supplemental O2, IV, PCA pump with CO2 monitor, cervical collar, NERI drain, BP, pulse ox, and B/L SCDs donned. Pt reported no pain at this time just discomfort. Pt has no c/o lightheadedness, dizziness or nausea. Pt's vitals assessed WNLs. Pt able to ambulate a total of 5 ft w/ RW/GB as pt is limited with mobility secondary to CO2 monitor connection, pt demonstrates decrease kevin and decrease step clearance during task. Pt transferred back to bed where pt was left in supine after session, call bell and tray in reach, PCA pump in reach,  B/L SCDs donned, vitals assessed WNLs, nurse notified after session. Patient will benefit from skilled intervention to address the above impairments. Patient?s rehabilitation potential is considered to be Good Factors which may influence rehabilitation potential include:  
? None noted ? Mental ability/status ? Medical condition ? Home/family situation and support systems ? Safety awareness 
? Pain tolerance/management 
? Other: PLAN : 
Recommendations and Planned Interventions: 
?           Bed Mobility Training             ? Neuromuscular Re-Education ? Transfer Training                   ? Orthotic/Prosthetic Training 
? Gait Training                          ? Modalities ? Therapeutic Exercises          ? Edema Management/Control ? Therapeutic Activities            ? Patient and Family Training/Education ? Other (comment): Frequency/Duration: Patient will be followed by physical therapy twice daily to address goals. Discharge Recommendations: Home Health Further Equipment Recommendations for Discharge: N/A  
 
SUBJECTIVE:  
Patient stated ? I just feel umcomfortable. ? OBJECTIVE DATA SUMMARY:  
 
Past Medical History:  
Diagnosis Date Sciatica Past Surgical History:  
Procedure Laterality Date HX HEENT Lasik Barriers to Learning/Limitations: yes;  physical 
Compensate with: Verbal Cues and Tactile Cues Prior Level of Function/Home Situation: 
Home Situation Home Environment: Private residence(Sister's Residence) # Steps to Enter: 2(Front door, however side step has 5 MENA w/ B/L HRs) Rails to Enter: No 
One/Two Story Residence: One story Living Alone: Yes Support Systems: Family member(s) Patient Expects to be Discharged to[de-identified] Private residence(Sister's Residence) Current DME Used/Available at Home: None Critical Behavior: 
Neurologic State: Alert Orientation Level: Oriented X4 Psychosocial 
 Purposeful Interaction: Yes Pt Identified Daily Priority: Clinical issues (comment) [employer] Process: Establish trust;Nurture spiritual self;Teaching/learning; Attend basic human needs;Create healing environment;Supportive expression Caring Interventions: Reassure Reassure: Informing;Caring rounds Skin Condition/Temp: Dry;Warm 
Skin Integrity: Incision (comment)(neck) Skin Integumentary Skin Color: Appropriate for ethnicity Skin Condition/Temp: Dry;Warm 
Skin Integrity: Incision (comment)(neck) Turgor: Non-tenting Hair Growth: Present Varicosities: Absent Strength:   
Strength: Generally decreased, functional 
Tone & Sensation:  
Tone: Normal 
Sensation: Intact Range Of Motion: 
AROM: Generally decreased, functional 
Functional Mobility: 
Bed Mobility: 
Supine to Sit: Stand-by assistance Sit to Supine: Stand-by assistance Scooting: Stand-by assistance Transfers: 
Sit to Stand: Contact guard assistance Stand to Sit: Contact guard assistance Balance:  
Sitting: Intact Standing: Intact; With support; Without support Ambulation/Gait Training: 
Distance (ft): 5 Feet (ft) Assistive Device: Brace/Splint;Gait belt;Walker, rolling Ambulation - Level of Assistance: Contact guard assistance Gait Description (WDL): Exceptions to [facility] Gait Abnormalities: Decreased step clearance Speed/Lucie: Slow Pain: 
Pain Scale 1: Numeric (0 - 10) Pain Intensity 1: 0 Activity Tolerance:  
Good Please refer to the flowsheet for vital signs taken during this treatment. After treatment:  
?         Patient left in no apparent distress sitting up in chair ? Patient left in no apparent distress in bed 
? Call bell left within reach ? Nursing notified ? Caregiver present ? Bed alarm activated COMMUNICATION/EDUCATION:  
?         Fall prevention education was provided and the patient/caregiver indicated understanding. ?         Patient/family have participated as able in goal setting and plan of care. ?         Patient/family agree to work toward stated goals and plan of care. ?         Patient understands intent and goals of therapy, but is neutral about his/her participation. ? Patient is unable to participate in goal setting and plan of care. Thank you for this referral. 
Agus Ramirez, PT Time Calculation: 23 mins Eval Complexity: History: MEDIUM  Complexity : 1-2 comorbidities / personal factors will impact the outcome/ POC Exam:LOW Complexity : 1-2 Standardized tests and measures addressing body structure, function, activity limitation and / or participation in recreation  Presentation: LOW Complexity : Stable, uncomplicated  Clinical Decision Making:Medium Complexity ambulate <30 ft  Overall Complexity:MEDIUM

## 2019-04-30 NOTE — PERIOP NOTES
TRANSFER - OUT REPORT: 
 
Verbal report given to Marky PINZON (name) on Percell Meckel III  being transferred to 2 S (unit) for routine progression of care Report consisted of patients Situation, Background, Assessment and  
Recommendations(SBAR). Information from the following report(s) SBAR, Kardex, Procedure Summary and Intake/Output was reviewed with the receiving nurse. Lines:  
Peripheral IV 04/30/19 Right Forearm (Active) Site Assessment Clean, dry, & intact 4/30/2019 12:52 PM  
Phlebitis Assessment 0 4/30/2019 12:52 PM  
Infiltration Assessment 0 4/30/2019 12:52 PM  
Dressing Status Clean, dry, & intact 4/30/2019 12:52 PM  
Dressing Type Transparent;Tape 4/30/2019 12:52 PM  
Hub Color/Line Status Infusing 4/30/2019 12:52 PM  
  
 
Opportunity for questions and clarification was provided. Patient transported with: 
 O2 @ 2 liters Registered Nurse

## 2019-04-30 NOTE — OP NOTES
Operative Note      Patient: Ricardo Sagastume               Sex: male          DOA: 4/30/2019         YOB: 1980      Age:  41 y.o. Preoperative Diagnosis: CERVICAL HNP W/MYELOPATHY C4-5,DISC DEGENERATION C4-5,C5-6,OSTEOPHYTE,STENOSIS CERVICAL REGION    Postoperative Diagnosis:  CERVICAL HNP W/MYELOPATHY C4-5,DISC DEGENERATION C4-5,C5-6,OSTEOPHYTE,STENOSIS CERVICAL REGION    Surgeon: Surgeon(s) and Role:     * Camilo Simental MD - Primary  Assistant: Alisha Chapman PA-C    OR Assitance: Physician Assistant: SEVEN Clarke    Anesthesia:  General    Procedure:  Procedure(s):  CERVICAL 3-6 ANTERIOR CERVICAL DISC FUSION W/C-ARM     Estimated Blood Loss: 50cc                 Implants:   Implant Name Type Inv. Item Serial No.  Lot No. LRB No. Used Action   nanofuse     [id number] N/A 1 Implanted   SPACER SPNE V-A LORDTC 9MM -- STRL ZERO-P VA - [id number]  SPACER SPNE V-A LORDTC 9MM -- STRL ZERO-P VA  SYNTHES [place] [id number] N/A 1 Implanted   SCR SPNE CERV SD V-A 3.7X16MM -- ZERO-P VA - [id number]  SCR SPNE CERV SD V-A 3.7X16MM -- ZERO-P VA  SYNTHES [place] [id number] N/A 2 Implanted   SPACER BNE CERV LORDS 7D 7MM -- VERTIGRAFT PRESERVON - [id number]  SPACER BNE CERV LORDS 7D 7MM -- VERTIGRAFT PRESERVON [id number] [facility]  N/A 1 Implanted   SPACER BNE CERV LORDS 7D 7MM -- VERTIGRAFT PRESERVON - [id number]  SPACER BNE CERV LORDS 7D 7MM -- VERTIGRAFT PRESERVON [id number] [facility] [employer]  N/A 1 Implanted       Drains: NERI           Complications:  None              Indications: This is a 41y.o. year-old male who presents with significant neck and upper extremity pain, worsening ability to do activities of daily living. X-rays and MRI scan revealing spinal stenosis, degenerative disk disease and disk herniation. Having failed conservative care, he comes for operative intervention.      Procedure Details:   After appropriate informed consent was obtained, the patient was taken to the operating suite and placed in a supine position on the operating table. Satisfactory general endotracheal anesthesia was induced. All pressure points were carefully padded. Perioperative antibiotics were given. The anterior neck was shaved, prepped, and draped in the usual sterile fashion. Intraoperative fluoroscopy was used to localize the C3-4 level. A transverse linear incision was made in the left anterior neck directly and was carried down through the subcutaneous tissue. The platysma was divided with electrocautery in a transverse fashion and was undermined both superiorly and inferiorly. Dissection was continued down along the anterior border of the sternocleidomastoid muscle. The carotid artery was palpated and was swept laterally. A plane was identified between the paratracheal musculature and the sternocleidmastoid  into the prevertebral space and was developed both superiorly and inferiorly using blunt dissection. Intraoperative fluoroscopy and a spinal needle were used to localize the C3-4 disc space. The prevertebral fascia was then incised longitudinally, and the longus colli muscles were swept laterally away from the bony elements of the spine on both sides. A self-retaining retractor with smooth blades was placed in the medial and lateral wound. 14 mm distraction pins were placed in the superior and inferior vertebral bodies. Next, the C3-4, C4-5 and C5-6 discs were removed completely with curettes and pituitary rongeurs. Cartilaginous endplates were removed. Posterolateral osteophytes were removed using the 2mm Kerrison rongeur. The posterior longitudinal ligament was opened with nerve hook and generous foraminotomies were created bilaterally. The nerve roots and spinal cord were found to be freely decompressed. The wound was then irrigated copiously with antibiotic solution. Meticulous hemostasis was obtained.     Osteophytes were removed from the posterior and anterior vertebral bodies with the eddie. The cartilagenous endplates were also removed from each of the vertebral bodies down to bleeding subchondral bone. The interbody space were then sized for PEEK interbody spacers with trial sizers and PEEK interbody spacers were filled with Conform Cube bone allograft then impacted into the interbody space. Each found to have a nice, snug fit. ANTERIOR INSTRUMENTATION:  Next, a Synthes Zero-P anterior cervical plate was placed at each level C3-4. Screws were then placed sequentially starting with an drill then followed by 16 mm locking screws. Two screws were placed through each plate securing the plates to each vertebra under fluoroscopic guidance. Allograft bone was then sized and placed at the C4-5 and C5-6. Anterior SpineFrontier place was then placed from C4-6 and secured with 6 screws: 2 into each vertebrae. Intraoperative fluoroscopy confirmed the entire construct to be in good position. The wound was once more copiously irrigated with antibiotic solution. The self-retaining retractor was removed from the wound. Meticulous hemostasis was obtained. The esophagus was inspected and was found to be intact. A NERI drain was inserted. The platysma was closed using interrupted 2-0 Vicryl sutures. The skin edges were closed with 3-0 PDS suture and approximated using Dermabond. A sterile dressing was applied to the wound. The patient was then transferred back to the stretcher where satisfactory general endotracheal anesthesia was reversed. The patient was then taken to the Recovery Room in satisfactory condition.

## 2019-05-01 VITALS
RESPIRATION RATE: 16 BRPM | HEIGHT: 71 IN | TEMPERATURE: 98.2 F | BODY MASS INDEX: 33.81 KG/M2 | HEART RATE: 65 BPM | SYSTOLIC BLOOD PRESSURE: 120 MMHG | DIASTOLIC BLOOD PRESSURE: 86 MMHG | OXYGEN SATURATION: 98 % | WEIGHT: 241.5 LBS

## 2019-05-01 PROBLEM — M48.02 CERVICAL SPINAL STENOSIS: Status: RESOLVED | Noted: 2019-04-24 | Resolved: 2019-05-01

## 2019-05-01 PROBLEM — M50.20 HNP (HERNIATED NUCLEUS PULPOSUS), CERVICAL: Status: RESOLVED | Noted: 2019-04-24 | Resolved: 2019-05-01

## 2019-05-01 LAB
ERYTHROCYTE [DISTWIDTH] IN BLOOD BY AUTOMATED COUNT: 12.4 % (ref 11.6–14.5)
HCT VFR BLD AUTO: 41.9 % (ref 36–48)
HGB BLD-MCNC: 13.9 G/DL (ref 13–16)
MCH RBC QN AUTO: 30.8 PG (ref 24–34)
MCHC RBC AUTO-ENTMCNC: 33.2 G/DL (ref 31–37)
MCV RBC AUTO: 92.7 FL (ref 74–97)
PLATELET # BLD AUTO: 272 K/UL (ref 135–420)
PMV BLD AUTO: 10.4 FL (ref 9.2–11.8)
RBC # BLD AUTO: 4.52 M/UL (ref 4.7–5.5)
WBC # BLD AUTO: 13.7 K/UL (ref 4.6–13.2)

## 2019-05-01 PROCEDURE — 36415 COLL VENOUS BLD VENIPUNCTURE: CPT

## 2019-05-01 PROCEDURE — 97116 GAIT TRAINING THERAPY: CPT

## 2019-05-01 PROCEDURE — 97166 OT EVAL MOD COMPLEX 45 MIN: CPT

## 2019-05-01 PROCEDURE — 97535 SELF CARE MNGMENT TRAINING: CPT

## 2019-05-01 PROCEDURE — 74011250637 HC RX REV CODE- 250/637: Performed by: PHYSICIAN ASSISTANT

## 2019-05-01 PROCEDURE — 85027 COMPLETE CBC AUTOMATED: CPT

## 2019-05-01 PROCEDURE — 74011250636 HC RX REV CODE- 250/636: Performed by: PHYSICIAN ASSISTANT

## 2019-05-01 RX ORDER — NALOXONE HYDROCHLORIDE 4 MG/.1ML
SPRAY NASAL
Qty: 1 EACH | Refills: 0 | Status: SHIPPED | OUTPATIENT
Start: 2019-05-01

## 2019-05-01 RX ORDER — OXYCODONE AND ACETAMINOPHEN 5; 325 MG/1; MG/1
1-2 TABLET ORAL
Qty: 84 TAB | Refills: 0 | Status: SHIPPED | OUTPATIENT
Start: 2019-05-01 | End: 2019-05-11

## 2019-05-01 RX ORDER — DIAZEPAM 5 MG/1
5 TABLET ORAL
Qty: 60 TAB | Refills: 0 | Status: SHIPPED | OUTPATIENT
Start: 2019-05-01

## 2019-05-01 RX ADMIN — CEFAZOLIN SODIUM 2 G: 2 SOLUTION INTRAVENOUS at 00:08

## 2019-05-01 RX ADMIN — Medication 10 ML: at 06:28

## 2019-05-01 RX ADMIN — OXYCODONE AND ACETAMINOPHEN 1 TABLET: 10; 325 TABLET ORAL at 06:20

## 2019-05-01 RX ADMIN — CEFAZOLIN SODIUM 2 G: 2 SOLUTION INTRAVENOUS at 08:52

## 2019-05-01 RX ADMIN — OXYCODONE AND ACETAMINOPHEN 1 TABLET: 10; 325 TABLET ORAL at 11:29

## 2019-05-01 RX ADMIN — DOCUSATE SODIUM 100 MG: 100 CAPSULE, LIQUID FILLED ORAL at 08:52

## 2019-05-01 NOTE — ROUTINE PROCESS
Bedside and Verbal shift change report given to RODRIGUE Alonzo RN (oncoming nurse) by Santo Payne RN (offgoing nurse). Report included the following information SBAR, Kardex, Intake/Output and MAR.

## 2019-05-01 NOTE — PROGRESS NOTES
0710 Assumed care of pt at this time. Pt in chair with no signs of distress. Pt left with call light within reach and encouraged to call for assistance. 8403 Head to toe assessment completed at this time  Patient is A&OX4. Pt denies N/V chest pain and SOB or distress. Pt is calm and cooperative. Pedal pulses are present. Capillary refill less than 3 seconds. Skin in warm and dry with non adherent dressing on anterior neck and is CDI. Lungs are clear bilaterally. Patient instructed on use and reason for incentive spirometer. Bowel sounds are active. Abdomen is soft and non-tender. ELLEN & SCDS in place bilaterally . Positive dorsalis pedis pulse, sensation, warm. No tingling or numbness to lower extremities. 20 g needle in the RFA. Pain scale explained to patient. Reasons for taking PRN meds explained to patient. Patient instructed to call for prn when needed. Pain level is 4. Patient was oriented to call bell and bed function. Will continue to monitor

## 2019-05-01 NOTE — ROUTINE PROCESS
Bedside and Verbal shift change report given to RODRIGUE Orlando RN (oncoming nurse) by Jaime RN (offgoing nurse). Report included the following information SBAR, Kardex, Intake/Output and MAR.

## 2019-05-01 NOTE — PROGRESS NOTES
Problem: Mobility Impaired (Adult and Pediatric) Goal: *Acute Goals and Plan of Care (Insert Text) Description Physical Therapy Goals Initiated 4/30/2019 and to be accomplished within 3-5 day(s) 1. Patient will move from supine <> sit with S in prep for out of bed activity and change of position. 2.  Patient will perform sit<> stand with S with LRAD in prep for transfers/ambulation. 3.  Patient will transfer from bed <> chair with S with LRAD for time up in chair for completion of ADL activity. 4.  Patient will ambulate 150 feet with LRAD/S for improved functional mobility/safe discharge. 5.  Patient will ascend/descend 3-5 stairs with or without handrail with minimal assistance/contact guard assist for home re-entry as needed. Outcome: Resolved/Met PHYSICAL THERAPY TREATMENT AND DISCHARGE Patient: Leonora Plummer (41 y.o. male) Date: 5/1/2019 Diagnosis: Cervical spinal stenosis [M48.02] Cervical spinal stenosis Procedure(s) (LRB): 
CERVICAL 3-6 ANTERIOR CERVICAL DISC FUSION W/C-ARM (N/A) 1 Day Post-Op Precautions: Fall, Spinal 
PLOF: independent, ambulatory without AD 
 
ASSESSMENT: 
Pt in bathroom upon entering room. Ambulated 220ft without AD, reciprocal gt pattern, steady pace, no LOB or path deviations. Negotiated 2 steps holding (B) hand rails. Returned to room and left sitting in chair. PLAN: 
Maximum therapeutic gains met at current level of care and patient will be discharged from physical therapy at this time. Rationale for discharge: 
?     Goals Achieved ? Brittany Kapoor ? Patient not participating in therapy ? Other: 
Discharge Recommendations:  None Further Equipment Recommendations for Discharge:  N/A  
 
SUBJECTIVE:  
Patient stated ? I was restless last night.? OBJECTIVE DATA SUMMARY:  
Critical Behavior: 
Neurologic State: Alert, Appropriate for age Orientation Level: Oriented X4 Functional Mobility Training: 
Bed Mobility: Supine to Sit: Independent Sit to Supine: Independent Transfers: 
Sit to Stand: Independent Stand to Sit: Independent Balance: 
Sitting: Intact Standing: Intact; Without support Ambulation/Gait Training: 
Distance (ft): 220 Feet (ft) Assistive Device: Gait belt Ambulation - Level of Assistance: Independent Gait Abnormalities: Decreased step clearance Speed/Lucie: Slow Stairs: 
Number of Stairs Trained: 2 Stairs - Level of Assistance: Supervision Rail Use: Both 
Pain: 
Pain level pre-treatment: 0/10 Pain level post-treatment: 0/10 Activity Tolerance:  
Good Please refer to the flowsheet for vital signs taken during this treatment. After treatment:  
? Patient left in no apparent distress sitting up in chair ? Patient left in no apparent distress in bed 
? Call bell left within reach ? Nursing notified ? Caregiver present ? Bed alarm activated ? SCDs applied COMMUNICATION/EDUCATION:  
?         Role of Physical Therapy in the acute care setting. ?         Fall prevention education was provided and the patient/caregiver indicated understanding. ? Patient/family have participated as able and agree with findings and recommendations. ?         Patient is unable to participate in plan of care at this time. ? Other: 
 
   
Beryl Goodpasture, PTA Time Calculation: 17 mins

## 2019-05-01 NOTE — PROGRESS NOTES
Transition of Care (JELLY) Plan:     Chart reviewed, met with pt in room. Pt planning discharge home with family in [place]. Pt states that Abeba Boggs from MD office had called and left detailed message that [facility] [place] agency unavailable to service pt at discharge address, was instructed on what to do at discharge. No other needs at this time, no DME needed, CM remains available. JELLY Transportation: How is patient being transported at discharge? Family/Friend When? Once cleared by Therapy between 12-2pm 
 
 Is transport scheduled? N/A Follow-up appointment and transportation: PCP/Specialist?  See AVS for Appointment Who is transporting to the follow-up appointment? Family/Friend Is transport for follow up appointment scheduled? N/A Communication plan (with patient/family): Who is being called? Patient or Next of Kin? Responsible party? Patient What number(s) is to be used? See [employer] What service provider is calling for [facility] services? When are they calling? 24-48 hours following discharge Readmission Risk? (Green/Low; Yellow/Moderate; Red/High):  [employer] Care Management Interventions PCP Verified by CM: Yes Transition of Care Consult (CM Consult): Home Health [facility] [place] - INPATIENT: No 
Reason Outside [facility]: Out of service area(unable to find [facility] to accept insurance at discharge address (not home)) Discharge Durable Medical Equipment: No 
Physical Therapy Consult: Yes Occupational Therapy Consult: Yes Current Support Network: Relative's Home Confirm Follow Up Transport: Family Plan discussed with Pt/Family/Caregiver: Yes Freedom of Choice Offered: Yes Discharge Location Discharge Placement: Home with family assistance

## 2019-05-01 NOTE — DISCHARGE INSTRUCTIONS
Dr. Ximena Lam Operative Instructions: Cervical Fusion    *YOU MUST AVOID SMOKING OR BEING AROUND ANYONE WHO SMOKES. AVOID ALL PRODUCTS THAT CONTAIN NICOTINE. DO NOT TAKE IBUPROFEN OR ANTI--INFLAMMATORIES, AS THESE MAY ALTER THE HEALING OF THE FUSION. Diet:   1. Begin with liquids and light foods such as jell-o or soups  2. Advance as tolerated to your regular diet if not nauseated. 3.  Swallowing difficulties may be experienced up to 2 weeks post-operatively. Modify food thickness as necessary. You may also obtain over-the-counter chloraseptic spray. Medications:  1. Strong oral narcotic pain medications have been prescribed for the first few days. Use only as directed. No pain medication is capable of taking away all the pain. Taking your pills at regular intervals will give you the best chance of having less pain. 2. If you need a refill PLEASE PLAN AHEAD. Call our office during regular hours (8-5). 3. Do not combine with alcoholic beverages. 4. Be careful as you walk, climb stairs or drive as mild dizziness is not unusual.  5. Do not take medications that have not been prescribed by your surgeon. 6. You may switch to over the counter pain medication of your choice as you become more comfortable. Activity and Restrictions:  1. You should not drive until you are clear by your surgeon at your post-operative visit. 2.  In regards to your cervical collar, you MUST wear this at all times until your first follow-up appointment. 3.  You should wear the collar even when sleeping. 4.  It can be removed for short periods of time as long as you are keeping your head centered over the shoulders without rotating or looking up or down. 5. You may take short walks inside and outside of your home and climb stairs. 6. You are to avoid work, housework, yard work, snow shoveling, lifting of more than a few pounds, overhead work or strenuous activity.   7. Avoid any excessive stretching or range-of-motion of the neck. Non-painful range-of-motion of the neck is allowed  8. Follow-up with Dr. Jesus Juárez in 7-10 days. DRIVIN. You should not drive until after your follow-up appointment. 2.  You can be in a vehicle for short distances, but if you travel any long distance, please stop about every 30 minutes and walk/stretch. 3.  You should NEVER drive while taking narcotic medication. BATHING and INCISION CARE:  1. The incision may be tender to touch or feel numb: this is normal.   2.  Keep the incision clean and dry. Do not get the incision wet for 5 days. The incision will be closed with sutures under the skin and the skin will be glued. 3.  Do not apply any lotions, ointments or oils on the incision. 4.  If you notice any excessive swelling, redness, or persistent drainage around the incision, notify the office immediately. RETURN TO WORK :  1. The decision to return to work will be determined on an individual basis. 2.  Many people who have a strenuous job (construction, heavy labor, etc) may need to be off work for up to 8 weeks. 3.  If you need a work note, please let us know as soon as possible, and not the same day you are planning to return to work. NUTRITION :  1.  Good nutrition is an essential part of healing. 2.  You should eat a balanced diet each day, including fruits, vegetables, dairy products and protein. 3.  Remember to drink plenty of water. 4.  If you have not had a bowel movement within 3 days of surgery, you will need to use a laxative or suppository that can be obtained over-the-counter at your local pharmacy. BONE STIMULATOR:  1. A spinal bone stimulator may be prescribed for you under certain situations. 2.  A nurse will call you if one has been requested and discuss its use for approximately 4-6 months post-op every day. 3.  This device assists in bone healing and fusion.     CALL THE OFFICE:   If you have severe pain unrelieved by the medications, new numbness or tingling in your arms;   If you have a fever of 101.0°F or greater;    If you notice excessive swelling, redness, or persistent drainage from the incision or IV site; [facility] office number is [phone] from 8:00am to 5:00pm Monday through Friday. After 5:00pm, on weekends, or holidays, please leave a message with our answering service and the doctor on-call will get back to you shortly.

## 2019-05-01 NOTE — ROUTINE PROCESS
[address] [place] TRANSFER - IN REPORT: 
 
Verbal report received from LILLIANA Wren RN on [address] [facility] [address]  being received from PACU for routine post - op. Report consisted of patients Situation, Background, Assessment and  
Recommendations(SBAR). Information from the following report(s) SBAR, Kardex, OR Summary, Intake/Output and MAR was reviewed with the receiving nurse. Opportunity for questions and clarification was provided. Assessment to be completed upon patients arrival to unit and care assumed.

## 2019-05-01 NOTE — PROGRESS NOTES
2104 - Patient in bed at this time. A/O x 4. IV to right forearm  intact and patent. SCDs and TEDs to bilateral legs. Non adherent dressing to anterior neck CDI. Cervical collar in place. No numbness/tingling. Pedal and radial pulses palpable. Lungs CTA. Bowel sounds active to all quadrants. Pain 6/10. PCA pump in place and functioning. NERI drain to anterior neck with serosanguinous drainage, small amount. Trace edema to bilateral LE. 
 
0621-Pain rated at 6/10, percocet 10 mg given and PCA taken down. NERI drain removed from anterior neck, nonadherent dressing with occlusive cover placed, cervical collar replaced. 0715-Pain decreased to 5/10. Shift summary-pain controlled with PCA pump. Stands at side of bed and moves without difficulty. Voiding in urinal without difficulty.

## 2019-05-01 NOTE — PROGRESS NOTES
Problem: Self Care Deficits Care Plan (Adult) Goal: *Acute Goals and Plan of Care (Insert Text) Description Initial Occupational Therapy Goals (5/1/2019) Within 7 day(s): 1. Patient will perform toilet transfer with Supervision in preparation for bowel and bladder management. 2. Patient will perform bowel and bladder management with setup for increased independence with ADLs. 3. Patient will perform UB dressing with setup/Guy (including back brace) for increased independence with ADLs. 4. Patient will perform LB dressing with setup for increased independence with ADLs. 5. Patient will adhere to back/spinal precautions 100% of the time with 1-2 verbal cues for increased independence with ADLs. 6. Patient will utilize energy conservation techniques with 1 verbal cue(s) for increased independence with ADLs. Outcome: Resolved/Met OCCUPATIONAL THERAPY EVALUATION/DISCHARGE Patient: Laney Callaway (41 y.o. male) Date: 5/1/2019 Primary Diagnosis: Cervical spinal stenosis [M48.02] Procedure(s) (LRB): 
CERVICAL 3-6 ANTERIOR CERVICAL DISC FUSION W/C-ARM (N/A) 1 Day Post-Op Precautions:  Fall, Spinal([place]) ASSESSMENT AND RECOMMENDATIONS: 
Based on the objective data described below, the patient presents with limitations d/t cervical neck immobility in [address] assisted in adjusting collar and review of gavin/doffing padding. Pt had pullover shirt and educated on need for neck flexion to perform independently. Pt able to gavin shirt cut w/ large neck opening w/ CGA and vc's. Pt able to complete dressing w/ LE with ankle-over-knee and able to manage tying shoes. Pt reports significant improvement in R hand and LLE deficits prior to sx.  Pt will require assist w/ [place] mgmt & compression hose which patient reports sister can assist.  Educated on body mechanics and improving body mechanics with work at  and tall stature vs using creeper and lowering car to increase better body mechanics. Pt in agreement and reports he may be looking for a different job. No other OT needs at this time, in this setting. Education: Reviewed home safety, body mechanics, importance of moving every hour to prevent joint stiffness, role of ice for edema/pain control, Rolling Walker management/safety, and adaptive dressing techniques with patient verbalizing  understanding at this time Discharge Recommendations: Home Health Further Equipment Recommendations for Discharge: To Be Determined (TBD) at next level of care SUBJECTIVE:  
Patient stated ? I noticed that the lift is at the highest it will go and I have to have my head slightly tilted to be able to walk under it. ? (re: body mechanics while at work) OBJECTIVE DATA SUMMARY:  
 
Past Medical History:  
Diagnosis Date Sciatica Past Surgical History:  
Procedure Laterality Date HX HEENT Lasik Barriers to Learning/Limitations: yes;  physical 
Compensate with: visual, verbal, tactile, kinesthetic cues/model Prior Level of Function/Home Situation: Pt w/ supportive sister whom he will stay with Home Situation Home Environment: Private residence(Sister's Residence) # Steps to Enter: 2(Front door, however side step has 5 MENA w/ B/L HRs) Rails to Enter: No 
One/Two Story Residence: One story Living Alone: Yes Support Systems: Family member(s) Patient Expects to be Discharged to[de-identified] Private residence(Sister's Residence) Current DME Used/Available at Home: None Tub or Shower Type: Tub/Shower combination Cognitive/Behavioral Status: 
Neurologic State: Alert; Appropriate for age Orientation Level: Appropriate for age;Oriented X4 Cognition: Appropriate decision making; Appropriate for age attention/concentration; Appropriate safety awareness; Follows commands Safety/Judgement: Awareness of environment; Fall prevention; Insight into deficits Skin: anterior neck incision w/ dressing Edema: compression hose in place & applied ice Coordination: 
Coordination: Within functional limits Fine Motor Skills-Upper: Left Intact; Right Intact Gross Motor Skills-Upper: Left Intact; Right Intact Balance: 
Sitting: Intact Standing: Intact; With support Strength: (B) UE Strength: Generally decreased, functional 
Tone & Sensation:(B) UE Tone: Normal 
Sensation: Intact Range of Motion:(B) UE 
AROM: Generally decreased, functional 
 
Functional Mobility and Transfers for ADLs: 
Bed Mobility: 
Supine to Sit: Supervision Sit to Supine: Supervision Scooting: Stand-by assistance Transfers: 
Sit to Stand: Supervision Bed to Chair: Supervision;Modified independent Toilet Transfer : Supervision;Modified independent ADL Assessment/Intervention[de-identified] 
Feeding: Setup Oral Facial Hygiene/Grooming: Supervision Bathing: Stand-by assistance; Additional time; Adaptive equipment;Minimum assistance Upper Body Dressing: Setup;Minimum assistance Lower Body Dressing: Setup Toileting: Supervision Feeding Drink to Mouth: Supervision/set-up Grooming Washing Hands: Supervision/set-up; Modified independent Upper Body Dressing Assistance Orthotics(Brace): Minimum assistance; Moderate assistance; Compensatory technique training Pullover Shirt: Contact guard assistance;Minimum assistance(cut large hole in \"Got Hardware\" melvin) Lower Body Dressing Assistance Underpants: Supervision/set-up Socks: Supervision/set-up(ankle-over-knee) Position Performed: Seated edge of bed LE Adaptive Equipment: 
? Adaptive Equipment was not issued due patient able to complete with out use of AE and use of AE would prevent stretching needed to progress with recovery. (Pt able to complete w/ compensatory strategies and able to compensate for socks w/ clothing modifications, but will require assist with Compression hose). Cognitive Retraining Problem Solving: Inductive reason; Identifying the task; Identifying the problem;General alternative solution;Deductive reason; Awareness of environment Executive Functions: Executing cognitive plans Organizing/Sequencing: Breaking task down;Prioritizing Safety/Judgement: Awareness of environment; Fall prevention; Insight into deficits Cues: Tactile cues provided;Verbal cues provided;Visual cues provided Pain: 
Pre-treatment: 3/10 Post-treatment: 3/10 Activity Tolerance:  
Patient able to stand 2-3 minute(s). Patient able to complete ADLs with intermittent rest breaks. Please refer to the flowsheet for vital signs taken during this treatment. After treatment:  
?  Patient left in no apparent distress sitting up in chair ? Patient sitting on EOB ? Patient left in no apparent distress in bed 
? Call bell left within reach ? Nursing notified ? Caregiver present ? Ice applied ? SCD's on while back in bed COMMUNICATION/EDUCATION:  
Communication/Collaboration: ? Home safety education was provided and the patient/caregiver indicated understanding. ? Patient/family have participated as able and agree with findings and recommendations. ?      Patient is unable to participate in plan of care at this time. Thank you for this referral. 
Beata Coyle, OTR/L Time Calculation: 23 mins Eval Complexity: History: HIGH Complexity : Extensive review of history including physical, cognitive and psychosocial history ; Examination: MEDIUM Complexity : 3-5 performance deficits relating to physical, cognitive , or psychosocial skils that result in activity limitations and / or participation restrictions; Decision Making:MEDIUM Complexity : Patient may present with comorbidities that affect occupational performnce. Miniml to moderate modification of tasks or assistance (eg, physical or verbal ) with assesment(s) is necessary to enable patient to complete evaluation

## 2019-05-01 NOTE — PROGRESS NOTES
Progress Note POD #1 Patient: Jacky Fernandez               Sex: male          DOA: 4/30/2019 YOB: 1980      Surgery: Procedure(s): CERVICAL 3-6 ANTERIOR CERVICAL DISC FUSION W/C-ARM         LOS:  LOS: 1 day Subjective: No new complaints Objective:  
  
Visit Vitals /86 Pulse 65 Temp 98.2 °F (36.8 °C) Resp 16 Ht 5' 10.5\" (1.791 m) Wt 109.5 kg (241 lb 8 oz) SpO2 98% BMI 34.16 kg/m² Physical Exam: 
Neurological: motor strength: 5/5 in upper and lower extremities bilaterally 
                        sensation: intact to light touch Intake and Output: 
Current Shift:  No intake/output data recorded. Last three shifts:  04/29 1901 - 05/01 0700 In: 2984 [P.O.:1260; I.V.:1724] Out: 5383 [Urine:3350; Drains:50] Lab/Data Reviewed: 
Lab Results Component Value Date/Time WBC 13.7 (H) 05/01/2019 02:35 AM  
 HGB 13.9 05/01/2019 02:35 AM  
 HCT 41.9 05/01/2019 02:35 AM  
 PLATELET 883 32/20/4263 02:35 AM  
 MCV 92.7 05/01/2019 02:35 AM  
 
No results found for: APTT No results found for: INR, PTMR, PTP, PT1, PT2 Assessment/Plan Principal Problem: 
  Cervical spinal stenosis (4/24/2019) Active Problems: 
  DDD (degenerative disc disease), cervical (4/24/2019) HNP (herniated nucleus pulposus), cervical (4/24/2019) Cervical cord myelomalacia ([facility] [address] (4/24/2019) 1. Stable 2. D/C PCA 3. D/C home after cleared by PT 
4. F/U at [facility] in 10 days 5. Change dressing- apply telfa & opsite. 6. D/C drain

## (undated) DEVICE — STERILE POLYISOPRENE POWDER-FREE SURGICAL GLOVES: Brand: PROTEXIS

## (undated) DEVICE — Device

## (undated) DEVICE — DRAIN SURG L49IN DIA3/32IN 10IN H SIL W/O TRCR END PERF

## (undated) DEVICE — HALTER TRACTION HD W/ TRI COTTON LINING FOAM LTX

## (undated) DEVICE — PREP SKN PREVAIL 40ML APPL --

## (undated) DEVICE — GARMENT,MEDLINE,DVT,INT,CALF,MED, GEN2: Brand: MEDLINE

## (undated) DEVICE — REM POLYHESIVE ADULT PATIENT RETURN ELECTRODE: Brand: VALLEYLAB

## (undated) DEVICE — PACK PROCEDURE SURG ANTR CERV DISCECTOMY

## (undated) DEVICE — SUTURE PROL SZ 3-0 L18IN NONABSORBABLE BLU L19MM PC-5 3/8 8632G

## (undated) DEVICE — SUTURE VCRL + SZ 2-0 L18IN ABSRB VLT CT-2 1/2 CIR TAPERCUT VCP726D

## (undated) DEVICE — ROCKER SWITCH PENCIL HOLSTER: Brand: VALLEYLAB

## (undated) DEVICE — PIN TEMP FIX FOR SCREW

## (undated) DEVICE — SOL IRRIGATION INJ NACL 0.9% 500ML BTL

## (undated) DEVICE — INSULATED BLADE ELECTRODE: Brand: EDGE

## (undated) DEVICE — 3M™ TEGADERM™ TRANSPARENT FILM DRESSING FRAME STYLE, 1626W, 4 IN X 4-3/4 IN (10 CM X 12 CM), 50/CT 4CT/CASE: Brand: 3M™ TEGADERM™

## (undated) DEVICE — APPLICATOR BNDG 1MM ADH PREMIERPRO EXOFIN

## (undated) DEVICE — 3.0MM PRECISION NEURO (MATCH HEAD)

## (undated) DEVICE — 1010 S-DRAPE TOWEL DRAPE 10/BX: Brand: STERI-DRAPE™

## (undated) DEVICE — LIMB HOLDER, WRIST/ANKLE: Brand: DEROYAL